# Patient Record
Sex: FEMALE | Race: WHITE | NOT HISPANIC OR LATINO | Employment: FULL TIME | URBAN - METROPOLITAN AREA
[De-identification: names, ages, dates, MRNs, and addresses within clinical notes are randomized per-mention and may not be internally consistent; named-entity substitution may affect disease eponyms.]

---

## 2017-01-06 ENCOUNTER — GENERIC CONVERSION - ENCOUNTER (OUTPATIENT)
Dept: OTHER | Facility: OTHER | Age: 32
End: 2017-01-06

## 2017-01-30 ENCOUNTER — ALLSCRIPTS OFFICE VISIT (OUTPATIENT)
Dept: OTHER | Facility: OTHER | Age: 32
End: 2017-01-30

## 2017-01-31 ENCOUNTER — GENERIC CONVERSION - ENCOUNTER (OUTPATIENT)
Dept: OTHER | Facility: OTHER | Age: 32
End: 2017-01-31

## 2017-01-31 LAB
EXTERNAL ABO GROUPING: NORMAL
EXTERNAL ANTIBODY SCREEN: NORMAL
EXTERNAL CHLAMYDIA SCREEN: NORMAL
EXTERNAL GONORRHEA SCREEN: NORMAL
EXTERNAL HEMATOCRIT: 37.7 %
EXTERNAL HEMOGLOBIN: 12.1 G/DL
EXTERNAL HEPATITIS B SURFACE ANTIGEN: NORMAL
EXTERNAL HIV-1 ANTIBODY: NORMAL
EXTERNAL PLATELET COUNT: 392 K/ΜL
EXTERNAL RH FACTOR: POSITIVE
EXTERNAL RUBELLA IGG QUANTITATION: NORMAL
EXTERNAL SYPHILIS RPR SCREEN: NORMAL

## 2017-02-01 LAB
ABO GROUP BLD: ABNORMAL
BASOPHILS # BLD AUTO: 0 %
BASOPHILS # BLD AUTO: 0 X10E3/UL (ref 0–0.2)
BLD GP AB SCN SERPL QL: NEGATIVE
DEPRECATED RDW RBC AUTO: 14.2 % (ref 12.3–15.4)
EOSINOPHIL # BLD AUTO: 0.1 X10E3/UL (ref 0–0.4)
EOSINOPHIL # BLD AUTO: 1 %
HCT VFR BLD AUTO: 37.7 % (ref 34–46.6)
HEPATITIS B SURFACE ANTIGEN (HISTORICAL): NEGATIVE
HGB BLD-MCNC: 12.1 G/DL (ref 11.1–15.9)
HIV-1/2 AB-AG (HISTORICAL): NON REACTIVE
IMM.GRANULOCYTES (CD4/8) (HISTORICAL): 0.1 X10E3/UL (ref 0–0.1)
IMM.GRANULOCYTES (CD4/8) (HISTORICAL): 1 %
LYMPHOCYTES # BLD AUTO: 1.8 X10E3/UL (ref 0.7–3.1)
LYMPHOCYTES # BLD AUTO: 25 %
MCH RBC QN AUTO: 28.4 PG (ref 26.6–33)
MCHC RBC AUTO-ENTMCNC: 32.1 G/DL (ref 31.5–35.7)
MCV RBC AUTO: 89 FL (ref 79–97)
MONOCYTES # BLD AUTO: 0.4 X10E3/UL (ref 0.1–0.9)
MONOCYTES (HISTORICAL): 5 %
NEUTROPHILS # BLD AUTO: 4.9 X10E3/UL (ref 1.4–7)
NEUTROPHILS # BLD AUTO: 68 %
PLATELET # BLD AUTO: 392 X10E3/UL (ref 150–379)
RBC (HISTORICAL): 4.26 X10E6/UL (ref 3.77–5.28)
RH BLD: POSITIVE
RPR SCREEN (HISTORICAL): NON REACTIVE
RUBELLA, IGG (HISTORICAL): 1.33 INDEX
WBC # BLD AUTO: 7.2 X10E3/UL (ref 3.4–10.8)

## 2017-02-02 LAB
CULTURE RESULT (HISTORICAL): NORMAL
MISCELLANEOUS LAB TEST RESULT (HISTORICAL): NORMAL

## 2017-02-07 LAB
COMMENT (HISTORICAL): NORMAL
CYSTIC FIBROSIS SCREEN (HISTORICAL): NORMAL

## 2017-02-23 ENCOUNTER — ALLSCRIPTS OFFICE VISIT (OUTPATIENT)
Dept: OTHER | Facility: OTHER | Age: 32
End: 2017-02-23

## 2017-02-28 ENCOUNTER — GENERIC CONVERSION - ENCOUNTER (OUTPATIENT)
Dept: OTHER | Facility: OTHER | Age: 32
End: 2017-02-28

## 2017-02-28 ENCOUNTER — ALLSCRIPTS OFFICE VISIT (OUTPATIENT)
Dept: PERINATAL CARE | Facility: CLINIC | Age: 32
End: 2017-02-28
Payer: COMMERCIAL

## 2017-02-28 LAB
ADEQUACY: (HISTORICAL): NORMAL
CHLAMYDIA DFA, NAA OR PCR (HISTORICAL): NEGATIVE
CLINICIAN PROVIDIED ICD 9 OR 10 (HISTORICAL): NORMAL
COMMENT (HISTORICAL): NORMAL
DIAGNOSIS (HISTORICAL): NORMAL
GC, DNA PROB (HISTORICAL): NEGATIVE
HPV HIGH RISK (HISTORICAL): NEGATIVE
NOTE: (HISTORICAL): NORMAL
PERFORMED BY (HISTORICAL): NORMAL
TEST INFORMATION (HISTORICAL): NORMAL

## 2017-02-28 PROCEDURE — 76813 OB US NUCHAL MEAS 1 GEST: CPT | Performed by: OBSTETRICS & GYNECOLOGY

## 2017-03-23 ENCOUNTER — GENERIC CONVERSION - ENCOUNTER (OUTPATIENT)
Dept: OTHER | Facility: OTHER | Age: 32
End: 2017-03-23

## 2017-04-20 ENCOUNTER — GENERIC CONVERSION - ENCOUNTER (OUTPATIENT)
Dept: OTHER | Facility: OTHER | Age: 32
End: 2017-04-20

## 2017-04-24 ENCOUNTER — ALLSCRIPTS OFFICE VISIT (OUTPATIENT)
Dept: PERINATAL CARE | Facility: CLINIC | Age: 32
End: 2017-04-24
Payer: COMMERCIAL

## 2017-04-24 ENCOUNTER — GENERIC CONVERSION - ENCOUNTER (OUTPATIENT)
Dept: OTHER | Facility: OTHER | Age: 32
End: 2017-04-24

## 2017-04-24 PROCEDURE — 76817 TRANSVAGINAL US OBSTETRIC: CPT | Performed by: OBSTETRICS & GYNECOLOGY

## 2017-04-24 PROCEDURE — 76811 OB US DETAILED SNGL FETUS: CPT | Performed by: OBSTETRICS & GYNECOLOGY

## 2017-05-18 ENCOUNTER — GENERIC CONVERSION - ENCOUNTER (OUTPATIENT)
Dept: OTHER | Facility: OTHER | Age: 32
End: 2017-05-18

## 2017-06-01 ENCOUNTER — GENERIC CONVERSION - ENCOUNTER (OUTPATIENT)
Dept: OTHER | Facility: OTHER | Age: 32
End: 2017-06-01

## 2017-06-01 LAB
EXTERNAL HEMATOCRIT: 31.8 %
EXTERNAL HEMOGLOBIN: 10.1 G/DL
GLUCOSE P FAST SERPL-MCNC: 116 MG/DL

## 2017-06-02 LAB
GLUCOSE, PLASMA (HISTORICAL): 116 MG/DL (ref 65–99)
HCT VFR BLD AUTO: 31.8 % (ref 34–46.6)
HGB BLD-MCNC: 10.1 G/DL (ref 11.1–15.9)
RPR SCREEN (HISTORICAL): NON REACTIVE

## 2017-06-06 ENCOUNTER — GENERIC CONVERSION - ENCOUNTER (OUTPATIENT)
Dept: OTHER | Facility: OTHER | Age: 32
End: 2017-06-06

## 2017-06-15 ENCOUNTER — ALLSCRIPTS OFFICE VISIT (OUTPATIENT)
Dept: OTHER | Facility: OTHER | Age: 32
End: 2017-06-15

## 2017-06-15 LAB — HCG, QUALITATIVE (HISTORICAL): NEGATIVE

## 2017-06-19 ENCOUNTER — GENERIC CONVERSION - ENCOUNTER (OUTPATIENT)
Dept: OTHER | Facility: OTHER | Age: 32
End: 2017-06-19

## 2017-06-19 ENCOUNTER — ALLSCRIPTS OFFICE VISIT (OUTPATIENT)
Dept: PERINATAL CARE | Facility: CLINIC | Age: 32
End: 2017-06-19
Payer: COMMERCIAL

## 2017-06-19 LAB — GESTATIONAL DIABETES SCREEN (HISTORICAL): 116 MG/DL (ref 65–139)

## 2017-06-19 PROCEDURE — 76816 OB US FOLLOW-UP PER FETUS: CPT | Performed by: OBSTETRICS & GYNECOLOGY

## 2017-06-20 ENCOUNTER — GENERIC CONVERSION - ENCOUNTER (OUTPATIENT)
Dept: OTHER | Facility: OTHER | Age: 32
End: 2017-06-20

## 2017-06-29 ENCOUNTER — ALLSCRIPTS OFFICE VISIT (OUTPATIENT)
Dept: OTHER | Facility: OTHER | Age: 32
End: 2017-06-29

## 2017-06-29 LAB — HCG, QUALITATIVE (HISTORICAL): NEGATIVE

## 2017-07-13 ENCOUNTER — GENERIC CONVERSION - ENCOUNTER (OUTPATIENT)
Dept: OTHER | Facility: OTHER | Age: 32
End: 2017-07-13

## 2017-07-25 ENCOUNTER — GENERIC CONVERSION - ENCOUNTER (OUTPATIENT)
Dept: OTHER | Facility: OTHER | Age: 32
End: 2017-07-25

## 2017-08-10 ENCOUNTER — GENERIC CONVERSION - ENCOUNTER (OUTPATIENT)
Dept: OTHER | Facility: OTHER | Age: 32
End: 2017-08-10

## 2017-08-17 ENCOUNTER — GENERIC CONVERSION - ENCOUNTER (OUTPATIENT)
Dept: OTHER | Facility: OTHER | Age: 32
End: 2017-08-17

## 2017-08-20 LAB — CULTURE GENITAL-BSB ON (HISTORICAL): POSITIVE

## 2017-08-24 ENCOUNTER — GENERIC CONVERSION - ENCOUNTER (OUTPATIENT)
Dept: OTHER | Facility: OTHER | Age: 32
End: 2017-08-24

## 2017-08-31 ENCOUNTER — ANESTHESIA EVENT (INPATIENT)
Dept: LABOR AND DELIVERY | Facility: HOSPITAL | Age: 32
End: 2017-08-31
Payer: COMMERCIAL

## 2017-08-31 ENCOUNTER — HOSPITAL ENCOUNTER (INPATIENT)
Facility: HOSPITAL | Age: 32
LOS: 3 days | Discharge: HOME | End: 2017-09-03
Attending: OBSTETRICS & GYNECOLOGY | Admitting: OBSTETRICS & GYNECOLOGY
Payer: COMMERCIAL

## 2017-08-31 ENCOUNTER — ANESTHESIA (INPATIENT)
Dept: LABOR AND DELIVERY | Facility: HOSPITAL | Age: 32
End: 2017-08-31
Payer: COMMERCIAL

## 2017-08-31 ENCOUNTER — GENERIC CONVERSION - ENCOUNTER (OUTPATIENT)
Dept: OTHER | Facility: OTHER | Age: 32
End: 2017-08-31

## 2017-08-31 ENCOUNTER — ALLSCRIPTS OFFICE VISIT (OUTPATIENT)
Dept: OTHER | Facility: OTHER | Age: 32
End: 2017-08-31

## 2017-08-31 DIAGNOSIS — Z3A.39 39 WEEKS GESTATION OF PREGNANCY: ICD-10-CM

## 2017-08-31 DIAGNOSIS — O99.350: Primary | ICD-10-CM

## 2017-08-31 DIAGNOSIS — G51.0: Primary | ICD-10-CM

## 2017-08-31 LAB
ALBUMIN SERPL BCP-MCNC: 2.3 G/DL (ref 3.5–5)
ALP SERPL-CCNC: 132 U/L (ref 46–116)
ALT SERPL W P-5'-P-CCNC: 19 U/L (ref 12–78)
ANION GAP SERPL CALCULATED.3IONS-SCNC: 14 MMOL/L (ref 4–13)
AST SERPL W P-5'-P-CCNC: 18 U/L (ref 5–45)
BASOPHILS # BLD AUTO: 0.02 THOUSANDS/ΜL (ref 0–0.1)
BASOPHILS NFR BLD AUTO: 0 % (ref 0–1)
BILIRUB SERPL-MCNC: 0.22 MG/DL (ref 0.2–1)
BUN SERPL-MCNC: 13 MG/DL (ref 5–25)
CALCIUM SERPL-MCNC: 9 MG/DL (ref 8.3–10.1)
CHLORIDE SERPL-SCNC: 104 MMOL/L (ref 100–108)
CO2 SERPL-SCNC: 22 MMOL/L (ref 21–32)
CREAT SERPL-MCNC: 0.68 MG/DL (ref 0.6–1.3)
CREAT UR-MCNC: 205 MG/DL
EOSINOPHIL # BLD AUTO: 0.05 THOUSAND/ΜL (ref 0–0.61)
EOSINOPHIL NFR BLD AUTO: 1 % (ref 0–6)
ERYTHROCYTE [DISTWIDTH] IN BLOOD BY AUTOMATED COUNT: 14.3 % (ref 11.6–15.1)
GFR SERPL CREATININE-BSD FRML MDRD: 117 ML/MIN/1.73SQ M
GLUCOSE SERPL-MCNC: 82 MG/DL (ref 65–140)
HCT VFR BLD AUTO: 33.3 % (ref 34.8–46.1)
HGB BLD-MCNC: 11.1 G/DL (ref 11.5–15.4)
LYMPHOCYTES # BLD AUTO: 1.79 THOUSANDS/ΜL (ref 0.6–4.47)
LYMPHOCYTES NFR BLD AUTO: 17 % (ref 14–44)
MCH RBC QN AUTO: 28.2 PG (ref 26.8–34.3)
MCHC RBC AUTO-ENTMCNC: 33.3 G/DL (ref 31.4–37.4)
MCV RBC AUTO: 85 FL (ref 82–98)
MONOCYTES # BLD AUTO: 0.81 THOUSAND/ΜL (ref 0.17–1.22)
MONOCYTES NFR BLD AUTO: 8 % (ref 4–12)
NEUTROPHILS # BLD AUTO: 7.71 THOUSANDS/ΜL (ref 1.85–7.62)
NEUTS SEG NFR BLD AUTO: 74 % (ref 43–75)
NRBC BLD AUTO-RTO: 0 /100 WBCS
PLATELET # BLD AUTO: 327 THOUSANDS/UL (ref 149–390)
PMV BLD AUTO: 11.3 FL (ref 8.9–12.7)
POTASSIUM SERPL-SCNC: 3.9 MMOL/L (ref 3.5–5.3)
PROT SERPL-MCNC: 7.3 G/DL (ref 6.4–8.2)
PROT UR-MCNC: 27 MG/DL
PROT/CREAT UR: 0.13 MG/G{CREAT} (ref 0–0.1)
RBC # BLD AUTO: 3.93 MILLION/UL (ref 3.81–5.12)
SODIUM SERPL-SCNC: 140 MMOL/L (ref 136–145)
WBC # BLD AUTO: 10.38 THOUSAND/UL (ref 4.31–10.16)

## 2017-08-31 PROCEDURE — 99204 OFFICE O/P NEW MOD 45 MIN: CPT

## 2017-08-31 PROCEDURE — 84156 ASSAY OF PROTEIN URINE: CPT | Performed by: OBSTETRICS & GYNECOLOGY

## 2017-08-31 PROCEDURE — 80053 COMPREHEN METABOLIC PANEL: CPT | Performed by: OBSTETRICS & GYNECOLOGY

## 2017-08-31 PROCEDURE — 82570 ASSAY OF URINE CREATININE: CPT | Performed by: OBSTETRICS & GYNECOLOGY

## 2017-08-31 PROCEDURE — 85025 COMPLETE CBC W/AUTO DIFF WBC: CPT | Performed by: OBSTETRICS & GYNECOLOGY

## 2017-08-31 RX ORDER — BUTORPHANOL TARTRATE 1 MG/ML
2 INJECTION, SOLUTION INTRAMUSCULAR; INTRAVENOUS ONCE
Status: COMPLETED | OUTPATIENT
Start: 2017-08-31 | End: 2017-08-31

## 2017-08-31 RX ORDER — MINERAL OIL AND PETROLATUM 150; 830 MG/G; MG/G
OINTMENT OPHTHALMIC
Status: DISCONTINUED | OUTPATIENT
Start: 2017-08-31 | End: 2017-09-01

## 2017-08-31 RX ORDER — BUTORPHANOL TARTRATE 1 MG/ML
INJECTION, SOLUTION INTRAMUSCULAR; INTRAVENOUS
Status: DISPENSED
Start: 2017-08-31 | End: 2017-09-01

## 2017-08-31 RX ORDER — ROPIVACAINE HYDROCHLORIDE 2 MG/ML
INJECTION, SOLUTION EPIDURAL; INFILTRATION; PERINEURAL AS NEEDED
Status: DISCONTINUED | OUTPATIENT
Start: 2017-08-31 | End: 2017-09-01 | Stop reason: SURG

## 2017-08-31 RX ORDER — SODIUM CHLORIDE, SODIUM LACTATE, POTASSIUM CHLORIDE, CALCIUM CHLORIDE 600; 310; 30; 20 MG/100ML; MG/100ML; MG/100ML; MG/100ML
125 INJECTION, SOLUTION INTRAVENOUS CONTINUOUS
Status: DISCONTINUED | OUTPATIENT
Start: 2017-08-31 | End: 2017-09-03 | Stop reason: HOSPADM

## 2017-08-31 RX ORDER — OXYTOCIN/RINGER'S LACTATE 30/500 ML
PLASTIC BAG, INJECTION (ML) INTRAVENOUS
Status: COMPLETED
Start: 2017-08-31 | End: 2017-09-03

## 2017-08-31 RX ORDER — BUTORPHANOL TARTRATE 1 MG/ML
1 INJECTION, SOLUTION INTRAMUSCULAR; INTRAVENOUS ONCE
Status: DISCONTINUED | OUTPATIENT
Start: 2017-08-31 | End: 2017-08-31

## 2017-08-31 RX ORDER — PREDNISONE 20 MG/1
80 TABLET ORAL DAILY
Status: DISCONTINUED | OUTPATIENT
Start: 2017-08-31 | End: 2017-09-03 | Stop reason: HOSPADM

## 2017-08-31 RX ADMIN — SODIUM CHLORIDE 5 MILLION UNITS: 0.9 INJECTION, SOLUTION INTRAVENOUS at 21:30

## 2017-08-31 RX ADMIN — ROPIVACAINE HYDROCHLORIDE 5 ML: 2 INJECTION, SOLUTION EPIDURAL; INFILTRATION at 23:24

## 2017-08-31 RX ADMIN — BUTORPHANOL TARTRATE 2 MG: 1 INJECTION, SOLUTION INTRAMUSCULAR; INTRAVENOUS at 22:05

## 2017-08-31 RX ADMIN — MINERAL OIL AND WHITE PETROLATUM: 150; 830 OINTMENT OPHTHALMIC at 20:40

## 2017-08-31 RX ADMIN — PREDNISONE 80 MG: 20 TABLET ORAL at 20:41

## 2017-08-31 RX ADMIN — ROPIVACAINE HYDROCHLORIDE 5 ML: 2 INJECTION, SOLUTION EPIDURAL; INFILTRATION at 23:26

## 2017-09-01 PROBLEM — G51.0 BELL'S PALSY: Status: ACTIVE | Noted: 2017-09-01

## 2017-09-01 PROBLEM — Z3A.39 39 WEEKS GESTATION OF PREGNANCY: Status: ACTIVE | Noted: 2017-09-01

## 2017-09-01 LAB
ABO GROUP BLD: NORMAL
B BURGDOR IGG SER IA-ACNC: 0.05
B BURGDOR IGM SER IA-ACNC: 0.24
BASE EXCESS BLDCOA CALC-SCNC: -2.4 MMOL/L (ref 3–11)
BASE EXCESS BLDCOV CALC-SCNC: -1.1 MMOL/L (ref 1–9)
BASOPHILS # BLD MANUAL: 0 THOUSAND/UL (ref 0–0.1)
BASOPHILS NFR MAR MANUAL: 0 % (ref 0–1)
BLD GP AB SCN SERPL QL: NEGATIVE
EOSINOPHIL # BLD MANUAL: 0 THOUSAND/UL (ref 0–0.4)
EOSINOPHIL NFR BLD MANUAL: 0 % (ref 0–6)
ERYTHROCYTE [DISTWIDTH] IN BLOOD BY AUTOMATED COUNT: 14.4 % (ref 11.6–15.1)
HCO3 BLDCOA-SCNC: 23.5 MMOL/L (ref 17.3–27.3)
HCO3 BLDCOV-SCNC: 24.4 MMOL/L (ref 12.2–28.6)
HCT VFR BLD AUTO: 31.7 % (ref 34.8–46.1)
HGB BLD-MCNC: 10.6 G/DL (ref 11.5–15.4)
LYMPHOCYTES # BLD AUTO: 0 % (ref 14–44)
LYMPHOCYTES # BLD AUTO: 0 THOUSAND/UL (ref 0.6–4.47)
MCH RBC QN AUTO: 28.6 PG (ref 26.8–34.3)
MCHC RBC AUTO-ENTMCNC: 33.4 G/DL (ref 31.4–37.4)
MCV RBC AUTO: 86 FL (ref 82–98)
MONOCYTES # BLD AUTO: 0.61 THOUSAND/UL (ref 0–1.22)
MONOCYTES NFR BLD: 3 % (ref 4–12)
NEUTROPHILS # BLD MANUAL: 19.88 THOUSAND/UL (ref 1.85–7.62)
NEUTS BAND NFR BLD MANUAL: 13 % (ref 0–8)
NEUTS SEG NFR BLD AUTO: 84 % (ref 43–75)
NRBC BLD AUTO-RTO: 0 /100 WBCS
O2 CT VFR BLDCOA CALC: 15.1 ML/DL
OXYHGB MFR BLDCOA: 64.6 %
OXYHGB MFR BLDCOV: 66.9 %
PCO2 BLDCOA: 44.4 MM[HG] (ref 30–60)
PCO2 BLDCOV: 43.2 MM HG (ref 27–43)
PH BLDCOA: 7.34 [PH] (ref 7.23–7.43)
PH BLDCOV: 7.37 [PH] (ref 7.19–7.49)
PLATELET # BLD AUTO: 260 THOUSANDS/UL (ref 149–390)
PLATELET BLD QL SMEAR: ADEQUATE
PMV BLD AUTO: 10.6 FL (ref 8.9–12.7)
PO2 BLDCOA: 28.3 MM HG (ref 5–25)
PO2 BLDCOV: 28.7 MM HG (ref 15–45)
RBC # BLD AUTO: 3.7 MILLION/UL (ref 3.81–5.12)
RBC MORPH BLD: NORMAL
RH BLD: POSITIVE
RPR SER QL: NORMAL
SAO2 % BLDCOV: 16.2 ML/DL
SPECIMEN EXPIRATION DATE: NORMAL
TOTAL CELLS COUNTED SPEC: 100
WBC # BLD AUTO: 20.49 THOUSAND/UL (ref 4.31–10.16)

## 2017-09-01 PROCEDURE — 86850 RBC ANTIBODY SCREEN: CPT | Performed by: OBSTETRICS & GYNECOLOGY

## 2017-09-01 PROCEDURE — 86592 SYPHILIS TEST NON-TREP QUAL: CPT | Performed by: OBSTETRICS & GYNECOLOGY

## 2017-09-01 PROCEDURE — 85027 COMPLETE CBC AUTOMATED: CPT | Performed by: OBSTETRICS & GYNECOLOGY

## 2017-09-01 PROCEDURE — 86901 BLOOD TYPING SEROLOGIC RH(D): CPT | Performed by: OBSTETRICS & GYNECOLOGY

## 2017-09-01 PROCEDURE — 82805 BLOOD GASES W/O2 SATURATION: CPT | Performed by: OBSTETRICS & GYNECOLOGY

## 2017-09-01 PROCEDURE — 86618 LYME DISEASE ANTIBODY: CPT | Performed by: PSYCHIATRY & NEUROLOGY

## 2017-09-01 PROCEDURE — 85007 BL SMEAR W/DIFF WBC COUNT: CPT | Performed by: OBSTETRICS & GYNECOLOGY

## 2017-09-01 PROCEDURE — 0KQM0ZZ REPAIR PERINEUM MUSCLE, OPEN APPROACH: ICD-10-PCS | Performed by: OBSTETRICS & GYNECOLOGY

## 2017-09-01 PROCEDURE — 4A1HXCZ MONITORING OF PRODUCTS OF CONCEPTION, CARDIAC RATE, EXTERNAL APPROACH: ICD-10-PCS | Performed by: OBSTETRICS & GYNECOLOGY

## 2017-09-01 PROCEDURE — 86900 BLOOD TYPING SEROLOGIC ABO: CPT | Performed by: OBSTETRICS & GYNECOLOGY

## 2017-09-01 RX ORDER — OXYCODONE HYDROCHLORIDE AND ACETAMINOPHEN 5; 325 MG/1; MG/1
1 TABLET ORAL EVERY 4 HOURS PRN
Status: DISCONTINUED | OUTPATIENT
Start: 2017-09-01 | End: 2017-09-03 | Stop reason: HOSPADM

## 2017-09-01 RX ORDER — IBUPROFEN 600 MG/1
600 TABLET ORAL EVERY 6 HOURS PRN
Status: DISCONTINUED | OUTPATIENT
Start: 2017-09-01 | End: 2017-09-03 | Stop reason: HOSPADM

## 2017-09-01 RX ORDER — POLYVINYL ALCOHOL 14 MG/ML
1 SOLUTION/ DROPS OPHTHALMIC
Status: DISCONTINUED | OUTPATIENT
Start: 2017-09-01 | End: 2017-09-03 | Stop reason: HOSPADM

## 2017-09-01 RX ORDER — CALCIUM CARBONATE 200(500)MG
1000 TABLET,CHEWABLE ORAL DAILY PRN
Status: DISCONTINUED | OUTPATIENT
Start: 2017-09-01 | End: 2017-09-03 | Stop reason: HOSPADM

## 2017-09-01 RX ORDER — ONDANSETRON 2 MG/ML
4 INJECTION INTRAMUSCULAR; INTRAVENOUS EVERY 8 HOURS PRN
Status: DISCONTINUED | OUTPATIENT
Start: 2017-09-01 | End: 2017-09-03 | Stop reason: HOSPADM

## 2017-09-01 RX ORDER — DIPHENHYDRAMINE HCL 25 MG
25 TABLET ORAL EVERY 6 HOURS PRN
Status: DISCONTINUED | OUTPATIENT
Start: 2017-09-01 | End: 2017-09-03 | Stop reason: HOSPADM

## 2017-09-01 RX ORDER — DOCUSATE SODIUM 100 MG/1
100 CAPSULE, LIQUID FILLED ORAL 2 TIMES DAILY
Status: DISCONTINUED | OUTPATIENT
Start: 2017-09-01 | End: 2017-09-03 | Stop reason: HOSPADM

## 2017-09-01 RX ORDER — ACETAMINOPHEN 325 MG/1
650 TABLET ORAL EVERY 6 HOURS PRN
Status: DISCONTINUED | OUTPATIENT
Start: 2017-09-01 | End: 2017-09-03 | Stop reason: HOSPADM

## 2017-09-01 RX ORDER — DIAPER,BRIEF,INFANT-TODD,DISP
1 EACH MISCELLANEOUS AS NEEDED
Status: DISCONTINUED | OUTPATIENT
Start: 2017-09-01 | End: 2017-09-03 | Stop reason: HOSPADM

## 2017-09-01 RX ADMIN — DOCUSATE SODIUM 100 MG: 100 CAPSULE, LIQUID FILLED ORAL at 08:13

## 2017-09-01 RX ADMIN — DOCUSATE SODIUM 100 MG: 100 CAPSULE, LIQUID FILLED ORAL at 17:33

## 2017-09-01 RX ADMIN — HYDROCORTISONE 1 APPLICATION: 1 CREAM TOPICAL at 04:55

## 2017-09-01 RX ADMIN — WITCH HAZEL 1 PAD: 500 SOLUTION RECTAL; TOPICAL at 04:55

## 2017-09-01 RX ADMIN — BENZOCAINE AND MENTHOL: 20; .5 SPRAY TOPICAL at 04:55

## 2017-09-01 RX ADMIN — Medication 250 UNITS: at 00:57

## 2017-09-01 RX ADMIN — POLYVINYL ALCOHOL 1 DROP: 14 SOLUTION/ DROPS OPHTHALMIC at 15:47

## 2017-09-01 RX ADMIN — PREDNISONE 80 MG: 20 TABLET ORAL at 08:13

## 2017-09-01 RX ADMIN — POLYVINYL ALCOHOL 1 DROP: 14 SOLUTION/ DROPS OPHTHALMIC at 22:42

## 2017-09-02 RX ADMIN — POLYVINYL ALCOHOL 1 DROP: 14 SOLUTION/ DROPS OPHTHALMIC at 17:13

## 2017-09-02 RX ADMIN — POLYVINYL ALCOHOL 1 DROP: 14 SOLUTION/ DROPS OPHTHALMIC at 08:33

## 2017-09-02 RX ADMIN — PREDNISONE 80 MG: 20 TABLET ORAL at 08:34

## 2017-09-02 RX ADMIN — DOCUSATE SODIUM 100 MG: 100 CAPSULE, LIQUID FILLED ORAL at 08:35

## 2017-09-02 RX ADMIN — DOCUSATE SODIUM 100 MG: 100 CAPSULE, LIQUID FILLED ORAL at 18:16

## 2017-09-03 VITALS
HEART RATE: 86 BPM | DIASTOLIC BLOOD PRESSURE: 79 MMHG | RESPIRATION RATE: 18 BRPM | WEIGHT: 178 LBS | OXYGEN SATURATION: 98 % | TEMPERATURE: 98.2 F | HEIGHT: 65 IN | SYSTOLIC BLOOD PRESSURE: 121 MMHG | BODY MASS INDEX: 29.66 KG/M2

## 2017-09-03 RX ADMIN — DOCUSATE SODIUM 100 MG: 100 CAPSULE, LIQUID FILLED ORAL at 09:21

## 2017-09-03 RX ADMIN — PREDNISONE 80 MG: 20 TABLET ORAL at 09:21

## 2017-09-07 ENCOUNTER — TELEPHONE (OUTPATIENT)
Dept: LABOR AND DELIVERY | Facility: HOSPITAL | Age: 32
End: 2017-09-07

## 2017-09-14 LAB — PLACENTA IN STORAGE: NORMAL

## 2017-10-12 ENCOUNTER — ALLSCRIPTS OFFICE VISIT (OUTPATIENT)
Dept: OTHER | Facility: OTHER | Age: 32
End: 2017-10-12

## 2017-10-12 ENCOUNTER — GENERIC CONVERSION - ENCOUNTER (OUTPATIENT)
Dept: OTHER | Facility: OTHER | Age: 32
End: 2017-10-12

## 2018-01-11 NOTE — PROGRESS NOTES
2017         RE: Bonnie Reyes                             To: [employer]   MR#: [id number]                                    Ray[address] 34   : MAR [address] [facility], [address] [place] [address] Street: [address] [place] [address]:HHFJW                             Fax: [phone]   (Exam #: [id number])      The LMP of this 33year old,  G1, P0-0-0-0 patient was 2016, giving   her an SMITA of SEP 4 2017 and a current gestational age of 33 weeks 0 days   by dates  A sonographic examination was performed on 2017 using   real time equipment  The ultrasound examination was performed using   abdominal technique  The patient has a BMI of 27 3  Her blood pressure   today was 113/76  Earliest ultrasound found in her record: 17  8w4d  SMITA 17      Cardiac motion was observed at 132 bpm       INDICATIONS      Interval growth assesment   Evaluate missed anatomy      Exam Types      Level I      RESULTS      Fetus # 1 of 1   Vertex presentation   Fetal growth appeared normal   Placenta Location = Posterior   No placenta previa   Placenta Grade = I      MEASUREMENTS (* Included In Average GA)      AC              23 5 cm        27 weeks 6 days* (24%)   BPD              7 3 cm        29 weeks 2 days* (49%)   HC              26 8 cm        28 weeks 6 days* (33%)   Femur            5 3 cm        28 weeks 1 day * (24%)      HC/AC           1 14   FL/AC           0 22   FL/BPD          0 72   EFW (Ac/Fl/Hc)  1177 grams - 2 lbs 10 oz                 (35%)      THE AVERAGE GESTATIONAL AGE is 28 weeks 4 days +/- 18 days  AMNIOTIC FLUID      Q1: 3 3      Q2: 3 8      Q3: 1 5      Q4: 3 3   JIMMY Total = 11 8 cm   Amniotic Fluid: Normal      ANATOMY DETAILS      Visualized Appearing Sonographically Normal:   HEAD: (Calvarium, BPD Level, Lateral Ventricles, Choroid Plexus);    TH    CAV : (Diaphragm);     HEART: (Four Chamber View, Proximal Left Outflow,   Proximal Right Outflow, 3VV, 3 Vessel Trachea, Interventricular Septum,   Interatrial Septum, Cardiac Axis, Cardiac Position);    STOMACH, RIGHT   KIDNEY, LEFT KIDNEY, BLADDER, SPINE: (Cervical Spine, Thoracic Spine); PLACENTA      ANATOMY COMMENTS      The prior fetal anatomic survey was limited the area of the cardiac   septum, right ventricular outflow tract, three vessel trachea view, four   chamber heart, cervical spine and thoracic spine  These anatomic views   were seen today as sonographically normal within the inherent limitations   of fetal ultrasound  IMPRESSION      Sanders IUP   28 weeks and 4 days by this ultrasound  (SMITA=SEP 7 2017)   Vertex presentation   Fetal growth appeared normal   Regular fetal heart rate of 132 bpm   Posterior placenta   No placenta previa      RECOMMENDATION      Growth Ultrasound: As indicated      GILBERTO Smith , CLOTILDE Ybarra     Maternal-Fetal Medicine   Electronically signed 06/19/17 13:32

## 2018-01-12 VITALS
WEIGHT: 134.6 LBS | HEIGHT: 64 IN | DIASTOLIC BLOOD PRESSURE: 77 MMHG | SYSTOLIC BLOOD PRESSURE: 118 MMHG | BODY MASS INDEX: 22.98 KG/M2

## 2018-01-12 NOTE — MISCELLANEOUS
Message   Recorded as Task   Date: 06/02/2017 08:46 AM, Created By: Ann Ambriz   Task Name: Go to Result   Assigned To: Lucia Alanis   Regarding Patient: Romelia Núñez, Status: In Progress   Comment:    Dario Enciso - 02 Jun 2017 8:46 AM     TASK CREATED  Have patient increase her iron intake   Liz Swift - 02 Jun 2017 11:06 AM     TASK IN PROGRESS   Liz Swift - 06 Jun 2017 1:05 PM     TASK EDITED  pt is aware           Active Problems    1  Encounter for supervision of normal pregnancy, unspecified, unspecified trimester   (V22 1) (Z34 90)    Current Meds   1  Prenatal+DHA 28-0 975 & 200 MG Oral Miscellaneous; Therapy: (Recorded:30Jan2017) to Recorded    Allergies    1  No Known Drug Allergies    2  No Known Environmental Allergies   3   No Known Food Allergies    Signatures   Electronically signed by : [facility], ; Jun 6 2017  1:06PM EST                       (Author)

## 2018-01-13 VITALS
HEIGHT: 64 IN | DIASTOLIC BLOOD PRESSURE: 71 MMHG | SYSTOLIC BLOOD PRESSURE: 111 MMHG | WEIGHT: 144 LBS | BODY MASS INDEX: 24.59 KG/M2

## 2018-01-13 VITALS
BODY MASS INDEX: 27.14 KG/M2 | SYSTOLIC BLOOD PRESSURE: 113 MMHG | WEIGHT: 159 LBS | HEIGHT: 64 IN | DIASTOLIC BLOOD PRESSURE: 76 MMHG

## 2018-01-13 NOTE — PROGRESS NOTES
Current Meds    1  Prenatal+DHA 28-0 975 & 200 MG Oral Miscellaneous; Therapy: (Recorded:30Jan2017) to Recorded    Allergies    1  No Known Drug Allergies    Results/Data  [id number] Transvaginal Ultrasound OB [facility]:   Procedure: [id number] pregnant uterus real time with image documentation, fetal and maternal evaluation, first trimester (<14 weeks 0 days), transvaginal approach: single or first gestation  The study was done today in the office  Indication: EDC gestational age 11w0d with an SMITA of 9/4/2017 by today's ultrasound weeks  Exam indication: New OB  Findings:   Number of gestational sacs and fetuses: one gestational sac containing one embryo  Gestational sac/fetal measurements appropriate for gestation:   CRL= 19 5mm  YS= 3 3mm  FHR= 168bpm    Survey of visible fetal and placental anatomic structure within normal limits  Qualitative assessment of amniotic fluid volume/gestational sac shape: within normal limits  Exam of maternal uterus and adnexa: within normal limits  Left ovary contains a 1 7cm corpus luteal cyst    Impression: Single, viable IUP c/w dates  8w4d with an SMITA of 9/7/2017        Future Appointments    Date/Time Provider Specialty Site   01/30/2017 03:00 PM [facility], Nurse [facility]     Signatures   Electronically signed by : Jose Ackerman, ; Jan 30 2017  3:04PM EST                       (Author)    Electronically signed by : CLOTILDE Chavarria ; Jan 31 2017  9:13AM EST

## 2018-01-14 VITALS
WEIGHT: 134.13 LBS | BODY MASS INDEX: 22.9 KG/M2 | DIASTOLIC BLOOD PRESSURE: 62 MMHG | SYSTOLIC BLOOD PRESSURE: 118 MMHG | HEIGHT: 64 IN

## 2018-01-15 NOTE — PROGRESS NOTES
2017         RE: Chirag Robert                             To: [facility] ([facility])   MR#: [id number]                                    [address]   : DEBI Merrill, [address] [place] [address] Street: [id number]                             Fax: [phone]   (Exam #: [id number])      The LMP of this 33year old,  G1, P0-0-0-0 patient was 2016, giving   her an SMITA of SEP 4 2017 and a current gestational age of 22 weeks 0 days   by dates  A sonographic examination was performed on 2017 using   real time equipment  The ultrasound examination was performed using   abdominal & vaginal techniques  The patient has a BMI of 24 7  Her blood   pressure today was 111/71  Earliest ultrasound found in her record: 17  8w4d  SMITA 17      Cardiac motion was observed at 141 bpm       INDICATIONS      fetal anatomical survey      Exam Types      LEVEL II   Transvaginal      RESULTS      Fetus # 1 of 1   Transverse presentation   Fetal growth appeared normal   Placenta Location = Posterior   No placenta previa   Placenta Grade = I      MEASUREMENTS (* Included In Average GA)      AC              15 7 cm        20 weeks 4 days* (42%)   BPD              4 7 cm        20 weeks 2 days* (30%)   HC              17 4 cm        19 weeks 6 days* (16%)   Femur            3 2 cm        20 weeks 1 day * (28%)      Nuchal Fold      3 3 mm   NBL              6 9 mm      Humerus          3 3 cm        21 weeks 1 day   (52%)      Cerebellum       2 2 cm        21 weeks 2 days   Biorbit          3 2 cm        20 weeks 2 days   CisternaMagna    4 9 mm      HC/AC           1 11   FL/AC           0 21   FL/BPD          0 68   EFW (Ac/Fl/Hc)   349 grams - 0 lbs 12 oz      THE AVERAGE GESTATIONAL AGE is 20 weeks 1 day +/- 10 days        AMNIOTIC FLUID         Largest Vertical Pocket = 4 6 cm   Amniotic Fluid: Normal      CERVICAL EVALUATION      The cervix appeared normal (Ultrasound Examination)  SUPINE      Cervical Length: 3 90 cm      OTHER TEST RESULTS           Funneling?: No             Dynamic Changes?: No        Resp  To TFP?: No      ANATOMY      Head                                    Normal   Face/Neck                               Normal   Th  Cav  Normal   Heart                                   See Details   Abd  Cav  Normal   Stomach                                 Suboptimal   Right Kidney                            Normal   Left Kidney                             Normal   Bladder                                 Normal   Abd  Wall                               Normal   Spine                                   See Details   Extrems                                 Normal   Genitalia                               Normal   Placenta                                Normal   Umbl  Cord                              Normal   Uterus                                  Normal   PCI                                     Normal      ANATOMY DETAILS      Visualized Appearing Sonographically Normal:   HEAD: (Calvarium, BPD Level, Cavum, Lateral Ventricles, Choroid Plexus,   Cerebellum, Cisterna Magna);    FACE/NECK: (Neck, Nuchal Fold, Profile,   Orbits, Nose/Lips, Palate, Face);    TH  CAV  : (Lungs, Diaphragm); HEART: (Proximal Left Outflow, 3VV, Short Patrick of Greater Vessels, Ductal   Arch, Aortic Arch, IVC, SVC, Cardiac Axis, Cardiac Position);    ABD    CAV : (Liver); RIGHT KIDNEY, LEFT KIDNEY, BLADDER, ABD  WALL, SPINE:   (Lumbar Spine, Sacrum);    EXTREMS: (Lt Humerus, Rt Humerus, Lt Forearm,   Rt Forearm, Lt Hand, Rt Hand, Lt Femur, Rt Femur, Lt Low Leg, Rt Low Leg,   Lt Foot, Rt Foot);    GENITALIA, PLACENTA, UMBL   CORD, UTERUS, PCI      Suboptimally Visualized:   STOMACH, SPINE: (Cervical Spine, Thoracic Spine)      Not Visualized:   HEART: (Four Chamber View, Proximal Right Outflow, 3 Vessel Trachea, Interventricular Septum, Interatrial Septum)      ANATOMY COMMENTS      Her survey of the fetal anatomy is not complete  The stomach bubble   appears small and should be reviewed again  No fetal structural abnormality or ultrasound marker for aneuploidy is   identified on the Level II ultrasound study today  The missed or limited   views above are secondary to her fetal position  Fetal growth and   amniotic fluid volume appear normal   Active movement of the fetal body &   extremities was seen  There is no suspicion of a subchorionic bleed  The   placental cord insertion was normal       Transvaginal US was used to assist in reviewing the anatomy without   success  ADNEXA      The left ovary appeared normal and measured 2 8 x 1 8 x 1 6 cm with a   volume of 4 2 cc  The right ovary appeared normal and measured 3 8 x 3 3 x   2 4 cm with a volume of 15 7 cc  IMPRESSION      Sanders IUP   20 weeks and 1 day by this ultrasound  (SMITA=SEP 10 2017)   Transverse presentation   Fetal growth appeared normal   Regular fetal heart rate of 141 bpm   Posterior placenta   No placenta previa      GENERAL COMMENT      The patient was informed of the findings and counseled about the   limitations of the exam in detecting all forms of fetal congenital   abnormalities  She denies any vaginal bleeding or uterine cramping/contractions  She does   feel fetal movement  Her sequential screen returned as normal with a one   in 6600 risk for Ivory Tracie, one in 10,000 risk for trisomy 18 and one and 6000   risk for neural tube defects  Exam shows she is comfortable and her abdomen is non tender  Follow up recommended:   Recommend a follow up US at 28 weeks for growth and missed anatomy and   review of the small stomach bubble seen today           The face to face time, in addition to time spent discussing ultrasound   results, was approximately 15 minutes, greater than 50% of which was spent   during counseling and coordination of care  GILBERTO Mixon M D     Maternal-Fetal Medicine   Electronically signed 04/24/17 23:04

## 2018-01-15 NOTE — MISCELLANEOUS
Message   Recorded as Task   Date: 06/19/2017 03:12 PM, Created By: Olga Bella   Task Name: Go to Result   Assigned To: Stacy Claudio   Regarding Patient: Stacy Mayo, Status: In Progress   Comment:    ZariaDario - 19 Jun 2017 3:12 PM     TASK CREATED  Needs extra iron   Yamileth Bell - 20 Jun 2017 9:30 AM     TASK IN PROGRESS   Patient aware, already taking PNV and extra iron tablet daily  Active Problems    1  Encounter for supervision of normal pregnancy, unspecified, unspecified trimester   (V22 1) (Z34 90)    Current Meds   1  Iron Supplement TABS; Therapy: (Recorded:15Jun2017) to Recorded   2  Prenatal+DHA 28-0 975 & 200 MG Oral Miscellaneous; Therapy: (Recorded:30Jan2017) to Recorded    Allergies    1  No Known Drug Allergies    2  No Known Environmental Allergies   3   No Known Food Allergies    Signatures   Electronically signed by : Leigh Ann Kaur, ; Jun 20 2017  4:51PM EST                       (Author)

## 2018-01-15 NOTE — MISCELLANEOUS
Message   Date: 06 Jan 2017 10:56 AM EST, Recorded By: Sagar Combs For: Mitra Double: Ana Maria Null, Self   Phone: [phone] (Home)   Reason: Other   Former patient called, positive HPT x 2, LMP 11/28/16, no c/o, advised PNV  Will schedule u/s and cc for end of month  Active Problems    1  Contraceptives (V25 02)    Current Meds   1  Balziva 0 4-35 MG-MCG Oral Tablet; TAKE 1 TABLET DAILY AS DIRECTED; Therapy: 41ENM3626 to (Evaluate:09Apr2014)  Requested for: 82WZZ2434; Last   Rx:[id number] Ordered   2  Balziva 0 4-35 MG-MCG Oral Tablet; TAKE 1 TABLET DAILY AS DIRECTED; Therapy: 12PYH2845 to (Last Rx:[id number])  Requested for: 44IIM7145 Ordered   3  NuvaRing 0 12-0 015 MG/24HR Vaginal Ring; INSERT 1 RING VAGINALLY FOR 3   WEEKS THEN 1 WEEK OFF; Therapy: 03RHN7007 to (Last Rx:[id number])  Requested for: 11Sug5412 Ordered    Allergies    1   No Known Drug Allergies    Signatures   Electronically signed by : Wade Dos Santos, ; Jan 6 2017 10:58AM EST                       (Author)

## 2018-01-15 NOTE — MISCELLANEOUS
Message  Return to work or school:   Trip Kay is under my professional care  She was seen in my office on 10/12/2017   She is able to return to work on  11/23/2017 without any restrictions  Dr Loan Longo  Signatures   Electronically signed by :  Marshal Otto, ; Oct 23 2017  4:28PM EST                       (Author)

## 2018-01-22 VITALS
WEIGHT: 151.38 LBS | DIASTOLIC BLOOD PRESSURE: 72 MMHG | HEIGHT: 64 IN | SYSTOLIC BLOOD PRESSURE: 120 MMHG | BODY MASS INDEX: 25.84 KG/M2

## 2018-01-22 VITALS
SYSTOLIC BLOOD PRESSURE: 118 MMHG | WEIGHT: 160.5 LBS | DIASTOLIC BLOOD PRESSURE: 80 MMHG | HEIGHT: 64 IN | BODY MASS INDEX: 27.4 KG/M2

## 2018-01-22 VITALS
SYSTOLIC BLOOD PRESSURE: 122 MMHG | BODY MASS INDEX: 28.7 KG/M2 | HEIGHT: 64 IN | WEIGHT: 168.13 LBS | DIASTOLIC BLOOD PRESSURE: 78 MMHG

## 2018-01-22 VITALS
SYSTOLIC BLOOD PRESSURE: 104 MMHG | WEIGHT: 155.13 LBS | DIASTOLIC BLOOD PRESSURE: 60 MMHG | HEIGHT: 64 IN | BODY MASS INDEX: 26.49 KG/M2

## 2018-01-22 VITALS
SYSTOLIC BLOOD PRESSURE: 116 MMHG | BODY MASS INDEX: 27.87 KG/M2 | WEIGHT: 163.25 LBS | HEIGHT: 64 IN | DIASTOLIC BLOOD PRESSURE: 74 MMHG

## 2018-01-22 VITALS
BODY MASS INDEX: 29.71 KG/M2 | WEIGHT: 174 LBS | SYSTOLIC BLOOD PRESSURE: 118 MMHG | HEIGHT: 64 IN | DIASTOLIC BLOOD PRESSURE: 74 MMHG

## 2018-01-22 VITALS
SYSTOLIC BLOOD PRESSURE: 116 MMHG | HEIGHT: 64 IN | WEIGHT: 156.25 LBS | DIASTOLIC BLOOD PRESSURE: 84 MMHG | BODY MASS INDEX: 26.67 KG/M2

## 2018-01-22 VITALS
HEIGHT: 64 IN | SYSTOLIC BLOOD PRESSURE: 118 MMHG | WEIGHT: 173.5 LBS | DIASTOLIC BLOOD PRESSURE: 66 MMHG | BODY MASS INDEX: 29.62 KG/M2

## 2018-01-22 VITALS
BODY MASS INDEX: 29.9 KG/M2 | DIASTOLIC BLOOD PRESSURE: 68 MMHG | HEIGHT: 64 IN | WEIGHT: 175.13 LBS | SYSTOLIC BLOOD PRESSURE: 118 MMHG

## 2018-01-22 VITALS
SYSTOLIC BLOOD PRESSURE: 118 MMHG | WEIGHT: 139.5 LBS | DIASTOLIC BLOOD PRESSURE: 62 MMHG | BODY MASS INDEX: 23.82 KG/M2 | HEIGHT: 64 IN

## 2018-01-22 VITALS
HEIGHT: 64 IN | BODY MASS INDEX: 28.41 KG/M2 | SYSTOLIC BLOOD PRESSURE: 128 MMHG | WEIGHT: 166.38 LBS | DIASTOLIC BLOOD PRESSURE: 84 MMHG

## 2018-01-22 VITALS
BODY MASS INDEX: 24.65 KG/M2 | WEIGHT: 144.38 LBS | SYSTOLIC BLOOD PRESSURE: 118 MMHG | DIASTOLIC BLOOD PRESSURE: 78 MMHG | HEIGHT: 64 IN

## 2018-01-22 VITALS
WEIGHT: 173.25 LBS | BODY MASS INDEX: 29.58 KG/M2 | HEIGHT: 64 IN | DIASTOLIC BLOOD PRESSURE: 72 MMHG | SYSTOLIC BLOOD PRESSURE: 116 MMHG

## 2018-03-07 NOTE — PROGRESS NOTES
Education  [employer] Education 1st Trimester:   First Trimester Education provided:   benefits of breastfeeding, importance of exclusive breastfeeding, early initiation of breastfeeding and exclusive breastfeeding for the first 6 months   The patient is undecided on breastfeeding     Prenatal education provided by: Sherie Syed      Signatures   Electronically signed by : CLOTILDE Alva ; Jan 30 2017  5:03PM EST                       (Author)

## 2018-03-08 DIAGNOSIS — Z30.8 ENCOUNTER FOR OTHER CONTRACEPTIVE MANAGEMENT: Primary | ICD-10-CM

## 2018-03-08 NOTE — TELEPHONE ENCOUNTER
This pt is done breastfeeding    She scheduled her yearly and this is her old pill that she was on  Monia Le

## 2018-04-25 DIAGNOSIS — Z30.8 ENCOUNTER FOR OTHER CONTRACEPTIVE MANAGEMENT: ICD-10-CM

## 2018-05-06 DIAGNOSIS — Z30.8 ENCOUNTER FOR OTHER CONTRACEPTIVE MANAGEMENT: ICD-10-CM

## 2018-05-07 RX ORDER — NORETHINDRONE AND ETHINYL ESTRADIOL 0.4-0.035
KIT ORAL
Qty: 28 TABLET | Refills: 1 | Status: SHIPPED | OUTPATIENT
Start: 2018-05-07 | End: 2018-05-22

## 2018-05-22 ENCOUNTER — ANNUAL EXAM (OUTPATIENT)
Dept: OBGYN CLINIC | Facility: CLINIC | Age: 33
End: 2018-05-22
Payer: COMMERCIAL

## 2018-05-22 VITALS
SYSTOLIC BLOOD PRESSURE: 116 MMHG | DIASTOLIC BLOOD PRESSURE: 68 MMHG | WEIGHT: 145 LBS | BODY MASS INDEX: 24.75 KG/M2 | HEIGHT: 64 IN

## 2018-05-22 DIAGNOSIS — Z01.419 WOMEN'S ANNUAL ROUTINE GYNECOLOGICAL EXAMINATION: Primary | ICD-10-CM

## 2018-05-22 DIAGNOSIS — Z30.015 ENCOUNTER FOR INITIAL PRESCRIPTION OF VAGINAL RING HORMONAL CONTRACEPTIVE: ICD-10-CM

## 2018-05-22 PROBLEM — G51.0 BELL'S PALSY: Status: RESOLVED | Noted: 2017-09-01 | Resolved: 2018-05-22

## 2018-05-22 PROBLEM — Z3A.39 39 WEEKS GESTATION OF PREGNANCY: Status: RESOLVED | Noted: 2017-09-01 | Resolved: 2018-05-22

## 2018-05-22 PROCEDURE — 99395 PREV VISIT EST AGE 18-39: CPT | Performed by: OBSTETRICS & GYNECOLOGY

## 2018-05-22 RX ORDER — ETONOGESTREL AND ETHINYL ESTRADIOL 11.7; 2.7 MG/1; MG/1
INSERT, EXTENDED RELEASE VAGINAL
Qty: 1 EACH | Refills: 12 | Status: SHIPPED | OUTPATIENT
Start: 2018-05-22

## 2018-05-22 NOTE — PROGRESS NOTES
Assessment/Plan:  1  Yearly exam-Pap smear deferred, self-breast awareness reviewed  2  Contraception-patient has been using Ovcon after stopping the mini pill with cessation of breast-feeding  She has noted some spotting occasionally with this and has some difficulty remembering to take the pill  She was interested in NuvaRing  She was counseled in detail about this device  Electronic prescription for 1 ring refill numeral 12 was sent a local pharmacy  She was counseled about classic use, 3 weeks on 1 week off  She may consider continuous use 4 weeks on and then replace at that time  3   History of Bell's palsy-resolved  4  Other-patient may consider getting pregnant this winter  Pre conceptual recommendations were reviewed  She is up-to-date with vaccinations and denies any history of birth defects  She will start vitamin with folic acid at least 1 month prior to conception for spina bifida prevention  Otherwise, she will follow up in 1 year or as needed  No problem-specific Assessment & Plan notes found for this encounter  Diagnoses and all orders for this visit:    Women's annual routine gynecological examination    Encounter for initial prescription of vaginal ring hormonal contraceptive  -     etonogestrel-ethinyl estradiol (NUVARING) 0 12-0 015 MG/24HR vaginal ring; Insert vaginally and leave in place for 3 consecutive weeks, then remove for 1 week  Subjective:      Patient ID: Cherry Phoenix is a 29 y o  female  Patient was seen today for yearly exam   Please see assessment plan for details  The following portions of the patient's history were reviewed and updated as appropriate: allergies, current medications, past family history, past medical history, past social history, past surgical history and problem list     Review of Systems   Constitutional: Negative for chills, diaphoresis, fatigue and fever     Respiratory: Negative for apnea, cough, chest tightness, shortness of breath and wheezing  Cardiovascular: Negative for chest pain, palpitations and leg swelling  Gastrointestinal: Negative for abdominal distention, abdominal pain, anal bleeding, constipation, diarrhea, nausea, rectal pain and vomiting  Genitourinary: Negative for difficulty urinating, dyspareunia, dysuria, frequency, hematuria, menstrual problem, pelvic pain, urgency, vaginal bleeding, vaginal discharge and vaginal pain  Musculoskeletal: Negative for arthralgias, back pain and myalgias  Skin: Negative for color change and rash  Neurological: Negative for dizziness, syncope, light-headedness, numbness and headaches  Hematological: Negative for adenopathy  Does not bruise/bleed easily  Psychiatric/Behavioral: Negative for dysphoric mood and sleep disturbance  The patient is not nervous/anxious  Objective:      /68 (BP Location: Left arm, Patient Position: Sitting, Cuff Size: Standard)   Ht 5' 4" (1 626 m)   Wt 65 8 kg (145 lb)   LMP 05/08/2018 (Exact Date)   BMI 24 89 kg/m²          Physical Exam    Objective      /68 (BP Location: Left arm, Patient Position: Sitting, Cuff Size: Standard)   Ht 5' 4" (1 626 m)   Wt 65 8 kg (145 lb)   LMP 05/08/2018 (Exact Date)   BMI 24 89 kg/m²     General:   alert and oriented, in no acute distress, alert, appears stated age and cooperative   Neck: normal to inspection and palpation   Breast: normal appearance, no masses or tenderness   Heart:    Lungs:    Abdomen: soft, non-tender, without masses or organomegaly   Vulva: normal   Vagina: Without erythema or lesions or discharge noted  Normal   Cervix: Without erythema or lesionsOr cervicitis    No Cervical motion tenderness and normal   Uterus: top normal size, anteverted, non-tender   Adnexa: no mass, fullness, tenderness   Rectum: negative

## 2018-05-22 NOTE — PATIENT INSTRUCTIONS
Planning for Pregnancy   WHAT YOU NEED TO KNOW:   Why is it important to plan for pregnancy? There are things you can do to get your body ready for a healthy pregnancy  A healthy pregnancy can improve your chance of having a healthy baby  The steps you need to take and the amount of time needed depends on your current health and habits  Work with your healthcare provider to help you plan a healthy pregnancy  What do I need to know about nutrition and exercise before pregnancy? · Eat a variety of healthy foods  Healthy foods include fruits, vegetables, whole-grain breads, low-fat dairy foods, beans, lean meats, and fish  Limit foods high in sugar, fat, and sodium  Limit your intake of fish to 2 servings each week  Choose fish low in mercury such as canned light tuna, shrimp, salmon, cod, or tilapia  Do not  eat fish high in mercury such as swordfish, tilefish, jagdeep mackerel, and shark  · Take 400 micrograms (mcg) of folic acid each day  This will help to prevent birth defects of the brain and spine such as spina bifida  Most women should take folic acid before pregnancy and up to 12 weeks after getting pregnant  · Exercise for at least 30 minutes, 5 days a week  Some examples of exercise include walking, biking, dancing, and swimming  Include muscle strengthening activities 2 days each week  Regular exercise provides many health benefits  It helps you manage your weight, and decreases your risk for type 2 diabetes, heart disease, stroke, and high blood pressure  Exercise can also help improve your mood  Ask your healthcare provider about the best exercise plan for you  How does weight affect pregnancy? · Obesity  can make it harder for you to get pregnant  It also increases your risk of health problems during pregnancy  Some of these health problems include gestational diabetes, high blood pressure, and infections  It can also increase your baby's risk of health problems such as birth defects   Your baby may also be large and harder to deliver or be born prematurely (early)  Your risk of miscarriage is also higher if you are obese  Work with your healthcare provider to reach a healthy weight before you try to get pregnant  · Being underweight  can also make it hard for you to get pregnant  It can also increase your risk of having a premature baby and miscarriage  Your baby may be born at a low birth weight  What do I need to know about smoking, alcohol, and drugs? · Smoking  increases your risk of a miscarriage and other health problems during pregnancy  Smoking can cause your baby to be born too early or weigh less at birth  Ask your healthcare provider for information if you need help quitting  · Alcohol  passes from your body to your baby through the placenta  It can affect your baby's brain development and cause fetal alcohol syndrome (FAS)  FAS is a group of conditions that causes mental, behavior, and growth problems  Talk to your healthcare provider if you abuse alcohol and need help quitting before pregnancy  · Drugs , such as marijuana and cocaine, should not be used while you are trying to get pregnant or during pregnancy  They increase your risk of problems during pregnancy and increase the risk of having a baby with health problems  These include birth defects, premature birth, and infant death  What do I need to know about medicines and supplements? Tell your healthcare provider about all the medicines and supplements you take  Certain medicines and supplements should not be used during pregnancy  These include over-the-counter medicines, prescription medicines, vitamins, and herbal supplements  He or she may recommend that you take different medicines that are safer during pregnancy  What do I need to know about immunizations? Tell your healthcare provider about all the immunizations you have had   If you have missed any immunizations, your healthcare provider may recommend that you update your immunizations  These include hepatitis B, influenza, MMR (measles, mumps, rubella), Tdap, and varicella immunizations  What tests may I need to have before pregnancy? Your healthcare provider may recommend that you have tests to screen for sexually transmitted infections  These include chlamydia, gonorrhea, herpes, HIV infection, syphilis, and tuberculosis  These infectious diseases should be treated before pregnancy, if needed  What do I need to know about toxic substances? Toxic substances can harm a developing baby  Examples include cleaning products, paints, solvents, pesticides, and other chemical products  They can increase the risk of having a miscarriage, premature birth, and low-birth weight baby  They also increase the risk of developmental delay and childhood cancer  Avoid exposure to toxic substances and materials at work and home  What do I need to know about genetic testing? Tell your healthcare provider about your and your partner's family history of genetic disorders, developmental delays, or other disabilities  Also tell your healthcare provider about any problems you have had in previous pregnancies  Your healthcare provider may recommend that you see a healthcare professional called a genetic counselor  He or she will talk to you about how genes, birth defects, and other medical conditions are passed down  He or she can also tell you about your risk for passing a genetic disease in a future pregnancy  How can I prepare for pregnancy if I have a medical condition? Medical conditions such as diabetes, high blood pressure, asthma, seizure disorders, and thyroid disorders should be managed before pregnancy  Mental health conditions, such as depression and anxiety, should also be treated  This will decrease your risk of having health problems during pregnancy  It will also decrease your baby's risk of medical problems   Medicines used to treat certain conditions are not safe to use during pregnancy and may need to be changed before you get pregnant  Ask your healthcare provider if it is safe for you to get pregnant if you have a medical condition  CARE AGREEMENT:   You have the right to help plan your care  Learn about your health condition and how it may be treated  Discuss treatment options with your caregivers to decide what care you want to receive  You always have the right to refuse treatment  The above information is an  only  It is not intended as medical advice for individual conditions or treatments  Talk to your doctor, nurse or pharmacist before following any medical regimen to see if it is safe and effective for you  © 2017 [employer]  Information is for End User's use only and may not be sold, redistributed or otherwise used for commercial purposes  All illustrations and images included in CareNotes® are the copyrighted property of [employer]  or [employer]